# Patient Record
Sex: MALE | Race: WHITE
[De-identification: names, ages, dates, MRNs, and addresses within clinical notes are randomized per-mention and may not be internally consistent; named-entity substitution may affect disease eponyms.]

---

## 2017-07-24 ENCOUNTER — HOSPITAL ENCOUNTER (EMERGENCY)
Dept: HOSPITAL 31 - C.ER | Age: 77
Discharge: HOME | End: 2017-07-24
Payer: MEDICARE

## 2017-07-24 VITALS — HEART RATE: 71 BPM | DIASTOLIC BLOOD PRESSURE: 84 MMHG | SYSTOLIC BLOOD PRESSURE: 171 MMHG

## 2017-07-24 VITALS — OXYGEN SATURATION: 96 %

## 2017-07-24 VITALS — RESPIRATION RATE: 18 BRPM

## 2017-07-24 VITALS — BODY MASS INDEX: 40.6 KG/M2

## 2017-07-24 VITALS — TEMPERATURE: 97.9 F

## 2017-07-24 DIAGNOSIS — E78.00: ICD-10-CM

## 2017-07-24 DIAGNOSIS — Z87.891: ICD-10-CM

## 2017-07-24 DIAGNOSIS — M79.1: Primary | ICD-10-CM

## 2017-07-24 DIAGNOSIS — I10: ICD-10-CM

## 2017-07-24 LAB
ALBUMIN SERPL-MCNC: 3.1 G/DL (ref 3.5–5)
ALBUMIN/GLOB SERPL: 1 {RATIO} (ref 1–2.1)
ALT SERPL-CCNC: 22 U/L (ref 21–72)
AST SERPL-CCNC: 18 U/L (ref 17–59)
BASOPHILS # BLD AUTO: 0 K/UL (ref 0–0.2)
BASOPHILS NFR BLD: 0.2 % (ref 0–2)
BILIRUB UR-MCNC: NEGATIVE MG/DL
BNP SERPL-MCNC: 1460 PG/ML (ref 0–900)
BUN SERPL-MCNC: 17 MG/DL (ref 9–20)
CALCIUM SERPL-MCNC: 8.5 MG/DL (ref 8.6–10.4)
EOSINOPHIL # BLD AUTO: 0.1 K/UL (ref 0–0.7)
EOSINOPHIL NFR BLD: 1.5 % (ref 0–4)
ERYTHROCYTE [DISTWIDTH] IN BLOOD BY AUTOMATED COUNT: 14.7 % (ref 11.5–14.5)
GFR NON-AFRICAN AMERICAN: 59
GLUCOSE UR STRIP-MCNC: NORMAL MG/DL
HGB BLD-MCNC: 14.5 G/DL (ref 12–18)
LEUKOCYTE ESTERASE UR-ACNC: (no result) LEU/UL
LYMPHOCYTES # BLD AUTO: 1.3 K/UL (ref 1–4.3)
LYMPHOCYTES NFR BLD AUTO: 13.3 % (ref 20–40)
MCH RBC QN AUTO: 30.8 PG (ref 27–31)
MCHC RBC AUTO-ENTMCNC: 33.6 G/DL (ref 33–37)
MCV RBC AUTO: 91.6 FL (ref 80–94)
MONOCYTES # BLD: 0.7 K/UL (ref 0–0.8)
MONOCYTES NFR BLD: 7.8 % (ref 0–10)
NEUTROPHILS # BLD: 7.3 K/UL (ref 1.8–7)
NEUTROPHILS NFR BLD AUTO: 77.2 % (ref 50–75)
NRBC BLD AUTO-RTO: 0 % (ref 0–2)
PH UR STRIP: 5 [PH] (ref 5–8)
PLATELET # BLD: 164 K/UL (ref 130–400)
PMV BLD AUTO: 8.6 FL (ref 7.2–11.7)
PROT UR STRIP-MCNC: NEGATIVE MG/DL
RBC # BLD AUTO: 4.71 MIL/UL (ref 4.4–5.9)
RBC # UR STRIP: NEGATIVE /UL
SP GR UR STRIP: 1.02 (ref 1–1.03)
URINE NITRATE: NEGATIVE
UROBILINOGEN UR-MCNC: 4 MG/DL (ref 0.2–1)
WBC # BLD AUTO: 9.5 K/UL (ref 4.8–10.8)

## 2017-07-24 PROCEDURE — 99285 EMERGENCY DEPT VISIT HI MDM: CPT

## 2017-07-24 PROCEDURE — 84484 ASSAY OF TROPONIN QUANT: CPT

## 2017-07-24 PROCEDURE — 71275 CT ANGIOGRAPHY CHEST: CPT

## 2017-07-24 PROCEDURE — 81001 URINALYSIS AUTO W/SCOPE: CPT

## 2017-07-24 PROCEDURE — 80053 COMPREHEN METABOLIC PANEL: CPT

## 2017-07-24 PROCEDURE — 74175 CTA ABDOMEN W/CONTRAST: CPT

## 2017-07-24 PROCEDURE — 96361 HYDRATE IV INFUSION ADD-ON: CPT

## 2017-07-24 PROCEDURE — 83880 ASSAY OF NATRIURETIC PEPTIDE: CPT

## 2017-07-24 PROCEDURE — 71010: CPT

## 2017-07-24 PROCEDURE — 96374 THER/PROPH/DIAG INJ IV PUSH: CPT

## 2017-07-24 PROCEDURE — 85025 COMPLETE CBC W/AUTO DIFF WBC: CPT

## 2017-07-24 NOTE — RAD
PROCEDURE:  CHEST RADIOGRAPH, 1 VIEW



HISTORY:

SOB



COMPARISON:

07/19/2016



FINDINGS:



LUNGS:

The lungs are well inflated and clear.



PLEURA:

No pneumothorax or pleural fluid seen.



CARDIOVASCULAR:

The heart is enlarged.  There is unfolding of the aorta.  Status post 

CABG.



OSSEOUS STRUCTURES:

No significant abnormalities.



VISUALIZED UPPER ABDOMEN:

Normal.



OTHER FINDINGS:

None. 



IMPRESSION:

No acute findings.

## 2017-07-24 NOTE — CT
CT dissection protocol 



Indication: chest/back pain, widened mediastinum in CXR



Technique: 



Contiguous axial images utilizing dissection protocol both without 

and with IV contrast. Coronal and Sagittal reformats generated and 

reviewed. 



This CT exam was performed using 1 or more of the following dose 

reduction techniques: Automated exposure control, adjustment of the 

MAA and/or kV according to patient size, and/or use of iterative 

reconstruction technique. 



Oral contrast was not administered. 100 cc visi opaque 320 



Radiation dose:



Total exam DLP = 2268.35 MGy-cm.



Comparison: Chest x-ray performed 4/24/17, CTA chest performed 

10/17/14 at 



Findings: 



Visualized portions of the inferior right thyroid pole appears 

grossly unremarkable.  The inferior left thyroid pole is not 

visualized. 



The mediastinal and hilar vascular structures appear within normal 

limits. Median sternotomy wires. Cardiomegaly.  Coronary artery 

calcifications. 



Bibasilar atelectasis. No focal consolidation.  No significant 

pleural effusion. No pneumothorax. No suspicious pulmonary nodules 

measuring greater than 5 mm. Small hiatal hernia. 



Hepatomegaly. Overall hypoattenuation of the hepatic parenchyma 

consistent with hepatic steatosis. Numerous low-density lesions 

within the liver several which appears cystic and others which are 

incompletely characterized on this study or are too small to 

characterize. Atrophic bilateral kidneys and bilateral low-density 

lesions which appear consistent with cysts.  Punctate approximately 2 

mm nonobstructing left renal calculus. Pancreatic atrophy. The 

gallbladder and adrenal glands appear unremarkable. 



The stomach is nondistended. Lack of oral contrast limits evaluation 

for bowel pathology. The bowel loops appear within normal limits of 

caliber without evidence of intestinal obstruction.  There is no 

definite free air. 



Fat containing right ventral abdominal wall hernia measures 

approximately 2.6 cm in transverse dimension. 



Enlarged prostate gland measures approximately 5.6 x 6.2 cm. The 

urinary bladder appears unremarkable. 



Osseous demineralization.  Extensive degenerative changes of the 

spine including prominent anterior confluent osteophyte formation. 



Impression: 



No evidence of aortic aneurysm or dissection. 



Hepatomegaly. Hepatic steatosis. Numerous low-density lesions within 

the liver several which appears cystic and others which are 

incompletely characterized on this study or are too small to 

characterize. 



Atrophic bilateral kidneys and bilateral low-density lesions which 

appear consistent with cysts.  Punctate approximately 2 mm 

nonobstructing left renal calculus. 



Fat containing right ventral abdominal wall hernia measures 

approximately 2.6 cm in transverse dimension. 



Enlarged prostate gland.  Recommend correlation with PSA. 



Additional findings as above.

## 2017-07-24 NOTE — C.PDOC
History Of Present Illness





78 y/o male presents to ED with complaints of diffuse body pain x2 days. Pt 

with history of gout, takes colchecin. His typical flare-ups involve his elbows

, knees and ankles. Denies podagra flares. Denies fever, chills, chest pain, 

SOB or any other complaints.


Time Seen by Provider: 07/24/17 13:02


Chief Complaint (Nursing): Dizziness/Lightheaded


History Per: Patient


History/Exam Limitations: no limitations


Onset/Duration Of Symptoms: Days


Current Symptoms Are (Timing): Still Present


Fall Associated With With Symptoms: No


Severity: Mild


Recent travel outside of the [place]: No





Past Medical History


Reviewed: Historical Data, Nursing Documentation, Vital Signs


Vital Signs: 


 Last Vital Signs











Temp  97.9 F   07/24/17 12:20


 


Pulse  71   07/24/17 18:20


 


Resp  18   07/24/17 18:20


 


BP  171/84 H  07/24/17 18:20


 


Pulse Ox  97   07/24/17 18:20














- Medical History


PMH: Benign Prostatic Hyperplasia, HTN, Hypercholesterolemia, Hypothyroidism


Surgical History: CABG (x2)





- [facility] Procedures








CORONAR ARTERIOGR-2 CATH (10/17/14)


DRAINAGE OF RIGHT KNEE JOINT, PERCUTANEOUS APPROACH (07/20/16)


ENDOSC POLYPECTOMY OF LG INTEST (05/29/14)


LEFT HEART CARDIAC CATH (10/17/14)


LT HEART ANGIOCARDIOGRAM (10/17/14)


VACCINATION NEC (10/17/14)








Family History: States: Unknown Family Hx





- Social History


Hx Tobacco Use: No


Hx Alcohol Use: No


Hx Substance Use: No





- Immunization History


Hx Tetanus Toxoid Vaccination: No


Hx Influenza Vaccination: No


Hx Pneumococcal Vaccination: No





Review Of Systems


Except As Marked, All Systems Reviewed And Found Negative.


Constitutional: Negative for: Fever, Chills


Cardiovascular: Negative for: Chest Pain


Respiratory: Negative for: Shortness of Breath


Musculoskeletal: Positive for: Other (diffuse body pain)





Physical Exam





- Physical Exam


Appears: Non-toxic, No Acute Distress, Other (obese)


Skin: Warm, Dry, No Rash


Head: Atraumatic, Normacephalic


Neck: Normal, Normal ROM, Supple


Chest: Symmetrical, No Tenderness


Cardiovascular: Rhythm Regular, No Murmur


Respiratory: Normal Breath Sounds, No Rales, No Rhonchi, No Wheezing


Gastrointestinal/Abdominal: Normal Exam, Soft, No Tenderness


Back: Normal Inspection


Extremity: Normal ROM, No Pedal Edema


Neurological/Psych: Oriented x3, Normal Speech, Normal Cognition





ED Course And Treatment





- Laboratory Results


Result Diagrams: 


 07/24/17 13:36





 07/24/17 13:36


Lab Interpretation: Normal


O2 Sat by Pulse Oximetry: 96 (room air)


Pulse Ox Interpretation: Normal





Medical Decision Making


Medical Decision Making: 





body aches/fatigue, no pna/pnx


? uncoiled aorta on CXR vs widened mediastinum, neg CT for dissection


d/w doron Wren to d/c home with opt f/u. 





Disposition


Doctor Will See Patient In The: Office


Counseled Patient/Family Regarding: Studies Performed, Diagnosis





- Disposition


Disposition: HOME/ ROUTINE


Disposition Time: 19:02


Condition: GOOD





- Clinical Impression


Clinical Impression: 


 Body aches








- Scribe Statement


The provider has reviewed the documentation as recorded by the Edison Livingston


Provider Attestation: 








All medical record entries made by the Edison were at my direction and 

personally dictated by me. I have reviewed the chart and agree that the record 

accurately reflects my personal performance of the history, physical exam, 

medical decision making, and the department course for this patient. I have 

also personally directed, reviewed, and agree with the discharge instructions 

and disposition.

## 2017-07-26 NOTE — CARD
--------------- APPROVED REPORT --------------





EKG Measurement

Heart [id number]

CO [id number]

[id number]

[id number]

[id number]



<Conclusion>

Sinus rhythm with marked sinus arrhythmia

Right bundle branch block

Left anterior fascicular block

*** Bifascicular block ***

Septal infarct, age undetermined

Abnormal ECG

## 2017-12-06 ENCOUNTER — HOSPITAL ENCOUNTER (EMERGENCY)
Dept: HOSPITAL 31 - C.ER | Age: 77
LOS: 1 days | Discharge: HOME | End: 2017-12-07
Payer: COMMERCIAL

## 2017-12-06 VITALS — RESPIRATION RATE: 18 BRPM

## 2017-12-06 VITALS — BODY MASS INDEX: 40.6 KG/M2

## 2017-12-06 DIAGNOSIS — M25.571: ICD-10-CM

## 2017-12-06 DIAGNOSIS — M10.9: Primary | ICD-10-CM

## 2017-12-06 DIAGNOSIS — M25.572: ICD-10-CM

## 2017-12-06 LAB
ALBUMIN/GLOB SERPL: 1.2 {RATIO} (ref 1–2.1)
ALP SERPL-CCNC: 95 U/L (ref 38–126)
ALT SERPL-CCNC: 39 U/L (ref 21–72)
AST SERPL-CCNC: 57 U/L (ref 17–59)
BASOPHILS # BLD AUTO: 0.1 K/UL (ref 0–0.2)
BASOPHILS NFR BLD: 0.7 % (ref 0–2)
BILIRUB SERPL-MCNC: 1.8 MG/DL (ref 0.2–1.3)
BUN SERPL-MCNC: 22 MG/DL (ref 9–20)
CALCIUM SERPL-MCNC: 8.6 MG/DL (ref 8.6–10.4)
CHLORIDE SERPL-SCNC: 100 MMOL/L (ref 98–107)
CO2 SERPL-SCNC: 25 MMOL/L (ref 22–30)
EOSINOPHIL # BLD AUTO: 0 K/UL (ref 0–0.7)
EOSINOPHIL NFR BLD: 0.4 % (ref 0–4)
ERYTHROCYTE [DISTWIDTH] IN BLOOD BY AUTOMATED COUNT: 14.2 % (ref 11.5–14.5)
GLOBULIN SER-MCNC: 3.5 GM/DL (ref 2.2–3.9)
GLUCOSE SERPL-MCNC: 121 MG/DL (ref 75–110)
HCT VFR BLD CALC: 48.9 % (ref 35–51)
LYMPHOCYTES # BLD AUTO: 1.5 K/UL (ref 1–4.3)
LYMPHOCYTES NFR BLD AUTO: 12.6 % (ref 20–40)
MCH RBC QN AUTO: 30.9 PG (ref 27–31)
MCHC RBC AUTO-ENTMCNC: 33.8 G/DL (ref 33–37)
MCV RBC AUTO: 91.2 FL (ref 80–94)
MONOCYTES # BLD: 1.1 K/UL (ref 0–0.8)
MONOCYTES NFR BLD: 9.6 % (ref 0–10)
NRBC BLD AUTO-RTO: 0.1 % (ref 0–2)
PLATELET # BLD: 201 K/UL (ref 130–400)
PMV BLD AUTO: 8.3 FL (ref 7.2–11.7)
POTASSIUM SERPL-SCNC: 5.4 MMOL/L (ref 3.6–5.2)
PROT SERPL-MCNC: 7.8 G/DL (ref 6.3–8.3)
SODIUM SERPL-SCNC: 134 MMOL/L (ref 132–148)
URATE SERPL-MCNC: 7.2 MG/DL (ref 3.5–8.5)
WBC # BLD AUTO: 11.7 K/UL (ref 4.8–10.8)

## 2017-12-06 PROCEDURE — 96361 HYDRATE IV INFUSION ADD-ON: CPT

## 2017-12-06 PROCEDURE — 81001 URINALYSIS AUTO W/SCOPE: CPT

## 2017-12-06 PROCEDURE — 80053 COMPREHEN METABOLIC PANEL: CPT

## 2017-12-06 PROCEDURE — 84550 ASSAY OF BLOOD/URIC ACID: CPT

## 2017-12-06 PROCEDURE — 96374 THER/PROPH/DIAG INJ IV PUSH: CPT

## 2017-12-06 PROCEDURE — 85025 COMPLETE CBC W/AUTO DIFF WBC: CPT

## 2017-12-06 PROCEDURE — 99284 EMERGENCY DEPT VISIT MOD MDM: CPT

## 2017-12-06 NOTE — C.PDOC
History Of Present Illness


77 year old male presents to the ED c/o B/L foot swelling and pain for the past 

3-4 days. Patient states taking medications with no relief. Patient denies any 

fever, nausea, vomit, weakness, numbness, CP, SOB, headache. 


Chief Complaint (Nursing): Lower Extremity Problem/Injury


History Per: Patient


History/Exam Limitations: no limitations


Onset/Duration Of Symptoms: Days


Current Symptoms Are (Timing): Still Present


Severity: None


Recent travel outside of the [place]: No


Additional History Per: Patient





Past Medical History


Reviewed: Historical Data, Nursing Documentation, Vital Signs


Vital Signs: 


 Last Vital Signs











Temp  97.3 F L  12/06/17 18:43


 


Pulse  78   12/06/17 18:43


 


Resp  18   12/06/17 18:43


 


BP  159/63 H  12/06/17 18:43


 


Pulse Ox  96   12/07/17 01:32














- Medical History


PMH: Benign Prostatic Hyperplasia, HTN, Hypercholesterolemia, Hypothyroidism


   Denies: Chronic Kidney Disease


Surgical History: CABG (x2)





- [facility] Procedures








CORONAR ARTERIOGR-2 CATH (10/17/14)


DRAINAGE OF RIGHT KNEE JOINT, PERCUTANEOUS APPROACH (07/20/16)


ENDOSC POLYPECTOMY OF LG INTEST (05/29/14)


LEFT HEART CARDIAC CATH (10/17/14)


LT HEART ANGIOCARDIOGRAM (10/17/14)


VACCINATION NEC (10/17/14)








Family History: States: Unknown Family Hx





- Social History


Hx Tobacco Use: No


Hx Alcohol Use: No


Hx Substance Use: No





- Immunization History


Hx Tetanus Toxoid Vaccination: No


Hx Influenza Vaccination: No


Hx Pneumococcal Vaccination: No





Review Of Systems


Constitutional: Negative for: Fever, Chills


Cardiovascular: Negative for: Chest Pain, Palpitations


Respiratory: Negative for: Cough, Shortness of Breath


Gastrointestinal: Negative for: Nausea, Vomiting, Abdominal Pain


Skin: Negative for: Rash


Neurological: Negative for: Weakness, Numbness





Physical Exam





- Physical Exam


Appears: Non-toxic, No Acute Distress


Skin: Normal Color, Warm, Dry


Head: Atraumatic, Normacephalic


Nose: No Discharge


Oral Mucosa: Moist


Neck: Normal ROM, Supple


Chest: Symmetrical


Cardiovascular: Rhythm Regular, No Murmur


Respiratory: Normal Breath Sounds, No Rales, No Rhonchi, No Wheezing


Gastrointestinal/Abdominal: Soft, No Tenderness


Extremity: Normal ROM, No Tenderness, No Calf Tenderness, No Deformity, 

Swelling (B/L feet)


Neurological/Psych: Oriented x3, Normal Speech, Normal Cognition


Gait: Steady





ED Course And Treatment





- Laboratory Results


Result Diagrams: 


 12/06/17 19:48





 12/06/17 19:48


O2 Sat by Pulse Oximetry: 96 (On RA)


Pulse Ox Interpretation: Normal





Medical Decision Making


Medical Decision Making: 


Plan:


* Blood work ordered


* Indocin 50 mg PO given


* Toradol 30 mg IVP given


* UA ordered











Disposition


Counseled Patient/Family Regarding: Diagnosis





- Disposition


Referrals: 


[facility] [Outside]


Disposition: HOME/ ROUTINE


Disposition Time: 01:29


Condition: STABLE


Additional Instructions: 


increased fluids by mouth. leg elevation





Prescriptions: 


Naproxen 375 mg PO TIDPC #14 tablet


Instructions:  Gout (ED), Arthralgia (ED), Leg Pain (ED)


Forms:  [facility] (English)





- POA


Present On Arrival: None





- Clinical Impression


Clinical Impression: 


 Leg pain, Arthralgia, Gout








- Scribe Statement


The provider has reviewed the documentation as recorded by the Scribantione Miranda





All medical record entries made by the Scribe were at my direction and 

personally dictated by me. I have reviewed the chart and agree that the record 

accurately reflects my personal performance of the history, physical exam, 

medical decision making, and the department course for this patient. I have 

also personally directed, reviewed, and agree with the discharge instructions 

and disposition.

## 2017-12-07 VITALS
DIASTOLIC BLOOD PRESSURE: 80 MMHG | HEART RATE: 77 BPM | TEMPERATURE: 97.8 F | SYSTOLIC BLOOD PRESSURE: 190 MMHG | OXYGEN SATURATION: 99 %

## 2017-12-07 LAB
BACTERIA #/AREA URNS HPF: (no result) /[HPF]
BILIRUB UR-MCNC: NEGATIVE MG/DL
GLUCOSE UR STRIP-MCNC: NORMAL MG/DL
HYALINE CASTS #/AREA URNS LPF: (no result) /LPF (ref 0–2)
KETONES UR STRIP-MCNC: NEGATIVE MG/DL
LEUKOCYTE ESTERASE UR-ACNC: (no result) LEU/UL
PH UR STRIP: 5 [PH] (ref 5–8)
PROT UR STRIP-MCNC: (no result) MG/DL
RBC # UR STRIP: NEGATIVE /UL
RBC #/AREA URNS HPF: 2 /HPF (ref 0–3)
SP GR UR STRIP: 1.02 (ref 1–1.03)
UROBILINOGEN UR-MCNC: 2 MG/DL (ref 0.2–1)
WBC #/AREA URNS HPF: 4 /HPF (ref 0–5)

## 2018-11-03 ENCOUNTER — HOSPITAL ENCOUNTER (INPATIENT)
Dept: HOSPITAL 31 - C.ER | Age: 78
LOS: 3 days | Discharge: HOME | DRG: 554 | End: 2018-11-06
Payer: MEDICARE

## 2018-11-03 VITALS — BODY MASS INDEX: 40.6 KG/M2

## 2018-11-03 VITALS — RESPIRATION RATE: 20 BRPM

## 2018-11-03 DIAGNOSIS — I25.10: ICD-10-CM

## 2018-11-03 DIAGNOSIS — N40.0: ICD-10-CM

## 2018-11-03 DIAGNOSIS — M10.9: Primary | ICD-10-CM

## 2018-11-03 DIAGNOSIS — E78.00: ICD-10-CM

## 2018-11-03 DIAGNOSIS — Z95.1: ICD-10-CM

## 2018-11-03 DIAGNOSIS — E03.9: ICD-10-CM

## 2018-11-03 DIAGNOSIS — I10: ICD-10-CM

## 2018-11-03 DIAGNOSIS — D72.829: ICD-10-CM

## 2018-11-03 DIAGNOSIS — M19.90: ICD-10-CM

## 2018-11-03 LAB
ALBUMIN SERPL-MCNC: 3.8 [, G/DL] (ref 3.5–5)
ALBUMIN/GLOB SERPL: 1.2 [,] (ref 1–2.1)
ALT SERPL-CCNC: 16 [, U/L] (ref 21–72)
ANISOCYTOSIS BLD QL SMEAR: SLIGHT [,]
APTT BLD: 32 [, SECONDS] (ref 21–34)
AST SERPL-CCNC: 15 [, U/L] (ref 17–59)
BASOPHILS # BLD AUTO: 0 [, K/UL] (ref 0–0.2)
BASOPHILS NFR BLD: 0.1 [, %] (ref 0–2)
BILIRUB UR-MCNC: NEGATIVE [,]
BUN SERPL-MCNC: 18 [, MG/DL] (ref 9–20)
CALCIUM SERPL-MCNC: 9.2 [, MG/DL] (ref 8.6–10.4)
EOSINOPHIL # BLD AUTO: 0 [, K/UL] (ref 0–0.7)
EOSINOPHIL NFR BLD: 0 [, %] (ref 0–4)
ERYTHROCYTE [DISTWIDTH] IN BLOOD BY AUTOMATED COUNT: 14.2 [, %] (ref 11.5–14.5)
GFR NON-AFRICAN AMERICAN: 54 [,]
GLUCOSE UR STRIP-MCNC: (no result) [, MG/DL]
HDLC SERPL-MCNC: 37 [, MG/DL] (ref 30–70)
HGB BLD-MCNC: 15.8 [, G/DL] (ref 12–18)
HYALINE CASTS #/AREA URNS LPF: (no result) [, /LPF] (ref 0–2)
INR PPP: 1.5 [,]
LDLC SERPL-MCNC: 92 [, MG/DL] (ref 0–129)
LEUKOCYTE ESTERASE UR-ACNC: (no result) [, LEU/UL]
LG PLATELETS BLD QL SMEAR: PRESENT [,]
LYMPHOCYTE: 4 [, %] (ref 20–40)
LYMPHOCYTES # BLD AUTO: 0.7 [, K/UL] (ref 1–4.3)
LYMPHOCYTES NFR BLD AUTO: 4.9 [, %] (ref 20–40)
MCH RBC QN AUTO: 30.5 [, PG] (ref 27–31)
MCHC RBC AUTO-ENTMCNC: 34.3 [, G/DL] (ref 33–37)
MCV RBC AUTO: 89 [, FL] (ref 80–94)
MONOCYTE: 2 [, %] (ref 0–10)
MONOCYTES # BLD: 0.8 [, K/UL] (ref 0–0.8)
MONOCYTES NFR BLD: 6.1 [, %] (ref 0–10)
NEUTROPHILS # BLD: 12.3 [, K/UL] (ref 1.8–7)
NEUTROPHILS NFR BLD AUTO: 88.9 [, %] (ref 50–75)
NEUTROPHILS NFR BLD AUTO: 93 [, %] (ref 50–75)
NEUTS BAND NFR BLD: 1 [, %] (ref 0–2)
NRBC BLD AUTO-RTO: 0 [, %] (ref 0–2)
OVALOCYTES BLD QL SMEAR: SLIGHT [,]
PH UR STRIP: 5 [,] (ref 5–8)
PLATELET # BLD EST: NORMAL [,]
PLATELET # BLD: 189 [, K/UL] (ref 130–400)
PMV BLD AUTO: 8.4 [, FL] (ref 7.2–11.7)
POIKILOCYTOSIS BLD QL SMEAR: SLIGHT [,]
PROT UR STRIP-MCNC: (no result) [, MG/DL]
PROTHROMBIN TIME: 16.8 [, SECONDS] (ref 9.7–12.2)
RBC # BLD AUTO: 5.19 [, MIL/UL] (ref 4.4–5.9)
RBC # UR STRIP: (no result) [,]
SP GR UR STRIP: 1.02 [,] (ref 1–1.03)
SQUAMOUS EPITHIAL: < 1 [, /HPF] (ref 0–5)
TOTAL CELLS COUNTED BLD: 100 [,]
UROBILINOGEN UR-MCNC: 4 [, MG/DL] (ref 0.2–1)
WBC # BLD AUTO: 13.8 [, K/UL] (ref 4.8–10.8)

## 2018-11-03 NOTE — C.PDOC
History Of Present Illness


78 year old male presents to ED complaining of x2 days of worsening pain to 

right elbow and right wrist making it painful to move right arm. Patient also 

complains of pain to right knee and right ankle making it painful to move right 

leg. Denies fever, chills, chest pain, cough, shortness of breath, nausea, 

vomiting, diarrhea, headache, dizziness, weakness, numbness.


Chief Complaint (Nursing): Upper Extremity Problem/Injury


History Per: Patient


History/Exam Limitations: no limitations


Onset/Duration Of Symptoms: Days


Current Symptoms Are (Timing): Still Present





Past Medical History


Reviewed: Historical Data, Nursing Documentation, Vital Signs


Vital Signs: 





                                Last Vital Signs











Temp  98.5 F   18 17:06


 


Pulse  87   18 18:11


 


Resp  20   18 18:11


 


BP  199/95 H  18 18:11


 


Pulse Ox  95   18 18:11














- Medical History


PMH: Benign Prostatic Hyperplasia, HTN, Hypercholesterolemia, Hypothyroidism


   Denies: Chronic Kidney Disease


Surgical History: CABG (x2)





- [facility] Procedures











CORONAR ARTERIOGR-2 CATH (10/17/14)


DRAINAGE OF RIGHT KNEE JOINT, PERCUTANEOUS APPROACH (16)


ENDOSC POLYPECTOMY OF LG INTEST (14)


LEFT HEART CARDIAC CATH (10/17/14)


LT HEART ANGIOCARDIOGRAM (10/17/14)


VACCINATION NEC (10/17/14)








Family History: States: No Known Family Hx





- Social History


Hx Tobacco Use: No


Hx Alcohol Use: No


Hx Substance Use: No





- Immunization History


Hx Tetanus Toxoid Vaccination: No


Hx Influenza Vaccination: No


Hx Pneumococcal Vaccination: No





Review Of Systems


Except As Marked, All Systems Reviewed And Found Negative.


Constitutional: Negative for: Fever, Chills


Cardiovascular: Negative for: Chest Pain


Respiratory: Negative for: Cough, Shortness of Breath


Gastrointestinal: Negative for: Nausea, Vomiting, Diarrhea


Musculoskeletal: Positive for: Arm Pain (Worsening pain to right elbow and right

wrist.), Leg Pain (Pain to right knee and right ankle.)


Neurological: Negative for: Weakness, Numbness, Headache, Dizziness





Physical Exam





- Physical Exam


Appears: Non-toxic, Other (In mild distress.)


Skin: Warm, Dry


Head: Atraumatic, Normacephalic


Eye(s): bilateral: PERRL, EOMI


Oral Mucosa: Moist


Neck: Supple


Chest: Symmetrical, No Deformity


Cardiovascular: Rhythm Regular, No Murmur


Respiratory: Normal Breath Sounds, No Rales, No Rhonchi, No Wheezing


Gastrointestinal/Abdominal: Soft, No Tenderness


Extremity: Other (Pain with movement of right elbow and wrist. Pain with 

movement of right knee and right ankle.)


Neurological/Psych: Oriented x3





ED Course And Treatment





- Laboratory Results


Result Diagrams: 


                                 18 17:00





                                 18 17:00


Lab Interpretation: Abnormal


ECG: Interpreted By Me


ECG Rhythm: Sinus Rhythm, R BBB, Nonspecific Changes (LAFB)


ECG Interpretation: Normal


Rate From EC


O2 Sat by Pulse Oximetry: 95 (RA)


Pulse Ox Interpretation: Normal





- Radiology


CXR: Interpreted by Me


CXR Interpretation: Yes: Heart Size, Other (widened mediastinum, previously 

described and CTA without dissection )


Reevaluation Time: 18:33


Reassessment Condition: Improved





- Physician Consult Information


Outcome Of Conversation: 1830: d/w Dr Coley ok to admit.  Recognizes pt's s/s 

of gouty attack, and ordered Solumedrol and Colchicine in ED.  OK to admit.





Medical Decision Making


Medical Decision Making: 





gouty attack x 2 days with pain R arm/elbow/wrist c/w prior gouty attacks.


leukocytosis supports





head ct unchanged





Disposition


Doctor Will See Patient In The: Hospital


Counseled Patient/Family Regarding: Studies Performed, Diagnosis





- Disposition


Disposition: HOSPITALIZED


Disposition Time: 18:34


Condition: FAIR





- Clinical Impression


Clinical Impression: 


 Gout attack








- Scribe Statement


The provider has reviewed the documentation as recorded by the Edison Amezcua Crow


Provider Attestation: 








All medical record entries made by the Edison were at my direction and 

personally dictated by me. I have reviewed the chart and agree that the record 

accurately reflects my personal performance of the history, physical exam, 

medical decision making, and the department course for this patient. I have also

personally directed, reviewed, and agree with the discharge instructions and 

disposition.

## 2018-11-03 NOTE — CT
Date of service: 



11/03/2018



PROCEDURE:  CT HEAD WITHOUT CONTRAST.



HISTORY:

R body pain/weak x 2 D, ? thalamic



COMPARISON:

Noncontrast head CT 07/20/2016.



TECHNIQUE:

Axial computed tomography images were obtained through the head/brain 

without intravenous contrast.  



Radiation dose:



Total exam DLP = 1165.13 mGy-cm.



This CT exam was performed using one or more of the following dose 

reduction techniques: Automated exposure control, adjustment of the 

mA and/or kV according to patient size, and/or use of iterative 

reconstruction technique.



FINDINGS:



HEMORRHAGE:

No intracranial hemorrhage. 



BRAIN:

Bilateral chronic lacunes are identified with 1 at the left thalamus 

and 3 at the right thalamus as well as inferiorly involving the 

caudate heads and bodies bilaterally. Good corticomedullary 

differentiation is again seen.  Reiterated diffuse cerebral atrophy 

and chronic microangiopathy.  No suspicious extra-axial fluid 

collection is identified and the midline brain anatomy appears 

grossly nonfocal as imaged. No mass effect identified.



VENTRICLES:

Unremarkable. No hydrocephalus. 



CALVARIUM:

Unremarkable.



PARANASAL SINUSES:

Unremarkable as visualized. No significant inflammatory changes.



MASTOID AIR CELLS:

Unremarkable as visualized. No inflammatory changes.



OTHER FINDINGS:

None.



IMPRESSION:

No definite acute intracranial findings as discussed above.  Stable 

age-related degenerative change are identified as well as bilateral 

chronic lacunes as discussed above.  Follow-up CT or MRI are 

available if clinically warranted.

## 2018-11-04 RX ADMIN — Medication SCH TAB: at 10:22

## 2018-11-04 RX ADMIN — METOPROLOL SUCCINATE SCH MG: 50 TABLET, EXTENDED RELEASE ORAL at 10:23

## 2018-11-04 RX ADMIN — METHYLPREDNISOLONE SODIUM SUCCINATE SCH MG: 40 INJECTION, POWDER, FOR SOLUTION INTRAMUSCULAR; INTRAVENOUS at 10:26

## 2018-11-04 NOTE — RAD
Date of service: 



11/03/2018



HISTORY:

 Code Stroke 



COMPARISON:

Portable chest 07/24/2017. 



FINDINGS:



LUNGS:

No active pulmonary disease.



PLEURA:

No significant pleural effusion identified, no pneumothorax apparent.



CARDIOVASCULAR:

No aortic atherosclerotic calcification present.



Prominent cardiac silhouette stable.  No pulmonary vascular 

congestion. 



OSSEOUS STRUCTURES:

Sternotomy wires reiterated.



VISUALIZED UPPER ABDOMEN:

Normal.



OTHER FINDINGS:

None.



IMPRESSION:

No interval acute cardiopulmonary disease appreciable.  Prominent 

cardiac silhouette reiterated.

## 2018-11-04 NOTE — HP
HISTORY OF PRESENT ILLNESS:  This is a 78-year-old [place] male with

history of multiple medical problems, presented with symptoms of right

upper extremity pain.  The patient stated that the pain started _____ of

admission.  The patient stated that he has difficulty moving his upper

right extremity as well as pain on the right knee also.  The patient was

evaluated in emergency room and admitted for further management.  Other

review of systems is negative.



ALLERGIES:  NO KNOWN ALLERGY.



MEDICATIONS:  As per MAR were reviewed and ordered.



SOCIAL HISTORY:  No history of smoking, EtOH or substance abuse.



FAMILY HISTORY:  Not contributory.



PAST MEDICAL HISTORY:  Coronary artery disease, status post coronary artery

bypass graft, gouty arthritis, hypertension.



PHYSICAL EXAMINATION:

GENERAL:  The patient is in bed, not in any cardiopulmonary distress, but

he is in pain.

VITAL SIGNS:  Blood pressure 154/69, temperature 98.2, respiratory rate 20,

pulse 68.

HEENT:  Pupils equal, reactive to light.  Normal-appearing mucosa of the

conjunctivae, oropharynx and nasal membrane mucosa.

NECK:  Supple.  No JVD.  No carotid bruit.  No lymph node.  No thyromegaly.

CHEST AND LUNGS:  Bilateral symmetrical expansion.  Good air exchange.  No

rales.  No rhonchi.

CARDIOVASCULAR SYSTEM:  PMI not localized.  S1 and S2.  No additional

sounds.

ABDOMEN:  Normoactive bowel sounds.  No tenderness.  No organomegaly.  No

masses.

EXTREMITIES:  The patient has warmness and tenderness on the right elbow as

well as right wrist with decreased range of motion.

CENTRAL NERVOUS SYSTEM:  Alert, awake, oriented x2.  No neurological

deficit could be appreciated.



ASSESSMENT:

1.  Possible acute multi-joint gouty arthritis.

2.  Hypertension.

3.  Osteoarthritis.

4.  Coronary artery disease status post coronary artery bypass graft.



PLAN:  We will start the patient on Solu-Medrol and colchicine.  Continue

current medications.  Resume the patient's home medicine.







__________________________________________

Lisa Coley MD



DD:  11/03/2018 22:44:30

DT:  11/03/2018 22:46:56

Job # [id number]

## 2018-11-05 LAB
BUN SERPL-MCNC: 41 [, MG/DL] (ref 9–20)
CALCIUM SERPL-MCNC: 8.8 [, MG/DL] (ref 8.6–10.4)
GFR NON-AFRICAN AMERICAN: 54 [,]
URATE SERPL-MCNC: 5.8 [, MG/DL] (ref 3.5–8.5)

## 2018-11-05 RX ADMIN — Medication SCH TAB: at 10:42

## 2018-11-05 RX ADMIN — METOPROLOL SUCCINATE SCH MG: 50 TABLET, EXTENDED RELEASE ORAL at 10:42

## 2018-11-05 RX ADMIN — METHYLPREDNISOLONE SODIUM SUCCINATE SCH MG: 40 INJECTION, POWDER, FOR SOLUTION INTRAMUSCULAR; INTRAVENOUS at 10:45

## 2018-11-05 NOTE — PN
DATE:  11/04/2018



DAILY PROGRESS NOTE



SUBJECTIVE:  The patient is seen today, 11/4/2018.  He still has pain on

the right upper extremity which is less compared to the admission.



PHYSICAL EXAMINATION:

VITAL SIGNS:  Blood pressure 136/72, temperature 97.9, respiratory rate 20,

and pulse 77.

HEENT:  Pupils equal and reactive to light.  Normal-appearing mucosa of the

conjunctivae, oropharynx, and nasal membrane mucosa.

NECK:  Supple.  No JVD.  No carotid bruit.  No lymph node.  No thyromegaly.

CHEST AND LUNGS:  Bilateral symmetrical expansion.  Good air exchange.  No

rales.  No rhonchi.

CARDIOVASCULAR SYSTEM:  PMI not localized.  S1 and S2.  No additional

sounds.

ABDOMEN:  Normoactive bowel sounds.  No tenderness.  No organomegaly.  No

masses.

EXTREMITIES:  No cyanosis, no clubbing, no edema.  Right elbow and wrist

still have tenderness as well as the right shoulder.

CENTRAL NERVOUS SYSTEM:  Alert, awake, oriented x2.  No neurological

deficit could be appreciated.



ASSESSMENT:

1.  Multiple joints acute gouty arthritis.

2.  Hypertension.

3.  Coronary artery disease, status post coronary artery bypass graft.



PLAN:  Continue colchicine and steroids.  We will do x-ray of the right

shoulder.







__________________________________________

Lisa Coley MD





DD:  11/04/2018 21:59:21

DT:  11/04/2018 22:02:05

Job # [id number]

## 2018-11-05 NOTE — RAD
PROCEDURE:  Radiographs of the right forearm.



HISTORY:

right arm gouty arthritis with pain and swelling



COMPARISON:

None available.



TECHNIQUE:

Frontal and lateral views obtained. 



FINDINGS:



BONES:

Osseous demineralization.  No acute displaced fracture.



JOINT SPACES:

No dislocation.



OTHER FINDINGS:

Soft tissue swelling.  No evidence of radiopaque foreign body.



IMPRESSION:

Soft tissue swelling.  No acute osseous abnormality detected.  

Correlate clinically.

## 2018-11-06 VITALS
HEART RATE: 60 BPM | TEMPERATURE: 97.9 F | OXYGEN SATURATION: 97 % | DIASTOLIC BLOOD PRESSURE: 78 MMHG | SYSTOLIC BLOOD PRESSURE: 174 MMHG

## 2018-11-06 RX ADMIN — METHYLPREDNISOLONE SODIUM SUCCINATE SCH MG: 40 INJECTION, POWDER, FOR SOLUTION INTRAMUSCULAR; INTRAVENOUS at 11:51

## 2018-11-06 RX ADMIN — Medication SCH TAB: at 11:50

## 2018-11-06 RX ADMIN — METOPROLOL SUCCINATE SCH MG: 50 TABLET, EXTENDED RELEASE ORAL at 11:51

## 2018-11-06 NOTE — CARD
--------------- APPROVED REPORT --------------





Date of service: 11/03/2018



EKG Measurement

Heart [id number]

AK [id number]

[id number]

[id number]

[id number]



<Conclusion>

Normal sinus rhythm

Right bundle branch block

Left anterior fascicular block

*** Bifascicular block ***

Voltage criteria for left ventricular hypertrophy

Abnormal ECG

## 2018-11-06 NOTE — PN
DATE:  11/05/2018



SUBJECTIVE:  The patient is seen today, 11/05/2018.  He has better mobility

of his right upper extremity.



PHYSICAL EXAMINATION:

VITAL SIGNS:  Blood pressure is 157/76, temperature 97.7, respiratory rate

20, and pulse 81.

HEENT:  Pupils equal, reactive to light.  Normal appearing mucosa of the

conjunctivae, oropharynx and nasal membrane mucosa.

NECK:  Supple.  No JVD.  No carotid bruit.  No lymph node.  No thyromegaly.

CHEST:  Lungs, bilateral symmetrical expansion.  Good air exchange.  No

rales, no rhonchi.

CARDIOVASCULAR:  PMI not localized.  S1, S2.  No additional sounds.

ABDOMEN:  Normoactive bowel sounds.  No tenderness.  No organomegaly.  No

masses.

EXTREMITIES:  No cyanosis, no clubbing, no edema.

CNS:  Alert, awake, oriented x2.  No neurological deficit could be

appreciated.



ASSESSMENT:  Multiple joint gouty arthritis, hypertension, coronary artery

disease, status post coronary artery bypass graft.



PLAN:  Continue current medications including prednisone and colchicine,

venous Doppler of the upper extremity to rule out DVT.  Continue home

medications.





__________________________________________

Lisa Coley MD







DD:  11/05/2018 22:30:15

DT:  11/05/2018 22:31:39

Job # [id number]

## 2018-11-06 NOTE — CP.PCM.PN
Objective





- Vital Signs/Intake and Output


Vital Signs (last 24 hours): 


                                        











Temp Pulse Resp BP Pulse Ox


 


 97.9 F   60   20   174/78 H  97 


 


 11/06/18 07:00  11/06/18 07:00  11/06/18 07:00  11/06/18 07:00  11/06/18 07:00








Intake and Output: 


                                        











 11/06/18 11/06/18





 06:59 18:59


 


Intake Total 2100 


 


Output Total 1650 


 


Balance 450 














- Labs


Labs: 


                                        





                                 11/03/18 17:00 





                                 11/05/18 13:45 





                                        











PT  16.8 SECONDS (9.7-12.2)  H  11/03/18  17:00    


 


INR  1.5   11/03/18  17:00    


 


APTT  32 SECONDS (21-34)   11/03/18  17:00    














Assessment and Plan





- Assessment and Plan (Free Text)


Assessment: 





78 year old male admitted with gouty arthritis, seen and examined. Right arm 

swelling has been resolving.

## 2018-11-07 NOTE — DS
REASON FOR ADMISSION:  This is a 78-year-old [place] male with history of

multiple medical problems who was admitted for multiple joint acute gouty

arthritis in the right upper extremity.



COURSE OF HOSPITALIZATION:  The patient was admitted to medical floor, and

he was started on both IV steroids and colchicine.  The patient's symptoms

gradually improved, and the patient started to regain movement in his upper

extremity.  The patient was discharged home in a stable condition.  To

continue tapered steroids as well as colchicine and to be followed as an

outpatient by Dr. Coley.



FINAL DIAGNOSES:  Multiple joint gouty arthritis, hypertension, coronary

artery disease, status post coronary artery bypass graft.







__________________________________________

[facility] MD Brijesh





DD:  11/06/2018 21:30:28

DT:  11/06/2018 21:32:12

Job # [id number]